# Patient Record
Sex: MALE | Race: WHITE | Employment: FULL TIME | URBAN - METROPOLITAN AREA
[De-identification: names, ages, dates, MRNs, and addresses within clinical notes are randomized per-mention and may not be internally consistent; named-entity substitution may affect disease eponyms.]

---

## 2017-08-30 ENCOUNTER — TELEPHONE (OUTPATIENT)
Dept: FAMILY MEDICINE | Facility: OTHER | Age: 47
End: 2017-08-30

## 2017-08-30 ENCOUNTER — OFFICE VISIT (OUTPATIENT)
Dept: FAMILY MEDICINE | Facility: OTHER | Age: 47
End: 2017-08-30
Payer: COMMERCIAL

## 2017-08-30 VITALS
RESPIRATION RATE: 16 BRPM | SYSTOLIC BLOOD PRESSURE: 128 MMHG | BODY MASS INDEX: 29.36 KG/M2 | DIASTOLIC BLOOD PRESSURE: 80 MMHG | TEMPERATURE: 97.4 F | WEIGHT: 253.8 LBS | HEART RATE: 56 BPM | HEIGHT: 78 IN

## 2017-08-30 DIAGNOSIS — M54.41 CHRONIC BILATERAL LOW BACK PAIN WITH BILATERAL SCIATICA: Primary | ICD-10-CM

## 2017-08-30 DIAGNOSIS — G45.9 TRANSIENT CEREBRAL ISCHEMIA, UNSPECIFIED TYPE: ICD-10-CM

## 2017-08-30 DIAGNOSIS — Z72.0 TOBACCO ABUSE: ICD-10-CM

## 2017-08-30 DIAGNOSIS — G89.29 CHRONIC BILATERAL LOW BACK PAIN WITH BILATERAL SCIATICA: Primary | ICD-10-CM

## 2017-08-30 DIAGNOSIS — M54.42 CHRONIC BILATERAL LOW BACK PAIN WITH BILATERAL SCIATICA: Primary | ICD-10-CM

## 2017-08-30 PROCEDURE — 99214 OFFICE O/P EST MOD 30 MIN: CPT | Performed by: NURSE PRACTITIONER

## 2017-08-30 RX ORDER — HYDROCODONE BITARTRATE AND ACETAMINOPHEN 5; 325 MG/1; MG/1
1-2 TABLET ORAL EVERY 6 HOURS PRN
Qty: 30 TABLET | Refills: 0 | Status: SHIPPED | OUTPATIENT
Start: 2017-08-30

## 2017-08-30 RX ORDER — CYCLOBENZAPRINE HCL 10 MG
10 TABLET ORAL 3 TIMES DAILY PRN
Qty: 30 TABLET | Refills: 1 | Status: SHIPPED | OUTPATIENT
Start: 2017-08-30 | End: 2018-09-27

## 2017-08-30 ASSESSMENT — PAIN SCALES - GENERAL: PAINLEVEL: EXTREME PAIN (9)

## 2017-08-30 NOTE — TELEPHONE ENCOUNTER
Bal Le is a 46 year old male who presents with low back pain.    NURSING ASSESSMENT:  Description:  Low pain, hurts to walk, radiation down back of both legs. spasms  Onset/duration:  On and off  Precip. factors:  Hx of CVA, TIA, hernia repair  Associated symptoms:  Numbness in armpit at times  Improves/worsens symptoms:  Ibuprofen, heat/ice rest not helping    Allergies: No Known Allergies    MEDICATIONS:   Taking over the counter medication(s?) Yes  Any medication side effects? No significant side effects    Any barriers to taking medication(s) as prescribed?  No  Medication(s) improving/managing symptoms?  No      NURSING PLAN: Nursing advice to patient should be seen today if possible    RECOMMENDED DISPOSITION:  See in 24 hours -   Will comply with recommendation: Yes  If further questions/concerns or if symptoms do not improve, worsen or new symptoms develop, call your PCP or [facility] as soon as possible.      Guideline used: back pain  Telephone Triage Protocols for Nurses, Fifth Edition, Sangeetha Redd RN

## 2017-08-30 NOTE — PROGRESS NOTES
SUBJECTIVE:   Bal Le is a 46 year old male who presents to clinic today for the following health issues:      Back Pain   Double hernia about 4 years ago    Duration: about 4 years, comes and goes        Specific cause: probably lifting at some point    Description:   Location of pain: low back all lower half of back and shooting into the back of his legs  Character of pain: stabbing  Pain radiation: down into legs, has been having spasms  New numbness or weakness in legs, not attributed to pain:  no but numbness in left upper shoulder when he is having his back pains    Intensity:  severe    History:   Pain interferes with job: YES,   History of back problems: yes, double hernia  Any previous MRI or X-rays: None  Sees a specialist for back pain:  No  Therapies tried without relief: chiropractor, naproxen, tylenol, heat, ice    Alleviating factors:   Improved by: after an adjustment seems to ease it up a bit, muscle relaxer for a GI thing he has and he had muscle relaxer and it helps his back    Precipitating factors:  Worsened by: Lying Flat and Walking            Accompanying Signs & Symptoms:  Risk of Fracture:  Recent history of trauma or blunt force and patient states multiple injuries in the past  Risk of Cauda Equina:  None  Risk of Infection:  None  Risk of Cancer:  None  Risk of Ankylosing Spondylitis:  Onset at age <35, male, AND morning back stiffness. no     Has had chronic back pain that comes and goes.  He thinks he has a pinched nerve.  Has lightening bolts going down both legs.  Lower back is cramping.  Ice packs.  Showers.  Tried some flexeril he had at home.  Has double hernia removed has flexeril at home for that and it helped.  It was old.  Has never had MRI of his back.  There is no comfortable position.  He did go to work today.  Does go to chiropractor.  Sees Dr. Posada in [place]. He has not done films.  No xrays of low backs.  Feels bladder and bowel intact.  No feet dragging.  No  "numbness or dragging in feet.      Has had a TIA in the past.  Is not taking his ASA.  Has not has his cholesterol checked.  States- he is \"fine\".  Is still smoking.  Declines smoking cessation today.    Problem list and histories reviewed & adjusted, as indicated.  Additional history: as documented    Patient Active Problem List   Diagnosis     CARDIOVASCULAR SCREENING; LDL GOAL LESS THAN 160     ETOH abuse     Health Care Home     Transient cerebral ischemia     Umbilical hernia     Past Surgical History:   Procedure Laterality Date     ESOPHAGOSCOPY, GASTROSCOPY, DUODENOSCOPY (EGD), COMBINED  1/27/2011    COMBINED ESOPHAGOSCOPY, GASTROSCOPY, DUODENOSCOPY (EGD), REMOVE FOREIGN BODY performed by LESLEY SOLIMAN at  GI     ESOPHAGOSCOPY, GASTROSCOPY, DUODENOSCOPY (EGD), COMBINED  1/27/2011    COMBINED ESOPHAGOSCOPY, GASTROSCOPY, DUODENOSCOPY (EGD), BIOPSY SINGLE OR MULTIPLE performed by LESLEY SOLIMAN at  GI     ESOPHAGOSCOPY, GASTROSCOPY, DUODENOSCOPY (EGD), COMBINED  3/7/2011    COMBINED ESOPHAGOSCOPY, GASTROSCOPY, DUODENOSCOPY (EGD) performed by LESLEY SOLIMAN at  GI     HERNIORRHAPHY INGUINAL  1/29/2013    Procedure: HERNIORRHAPHY INGUINAL;   attempted laparoscopic preperitoneal left inguinal hernia repair converted to open left inguinal hernia repair, Laparoscopic mesh repair of umbilical hernia ;  Surgeon: Landon Rose MD;  Location:  OR     LAPAROSCOPIC HERNIORRHAPHY UMBILICAL  1/29/2013    Procedure: LAPAROSCOPIC HERNIORRHAPHY UMBILICAL;;  Surgeon: Landon Rose MD;  Location:  OR       Social History   Substance Use Topics     Smoking status: Never Smoker     Smokeless tobacco: Current User     Types: Chew      Comment: Chews tobacco     Alcohol use 21.0 oz/week     42 Cans of beer per week      Comment: quit when had stroke     Family History   Problem Relation Age of Onset     Substance Abuse Mother      Substance Abuse Father      Unknown/Adopted Sister     " "     Current Outpatient Prescriptions   Medication Sig Dispense Refill     HYDROcodone-acetaminophen (NORCO) 5-325 MG per tablet Take 1-2 tablets by mouth every 6 hours as needed for moderate to severe pain maximum 6 tablet(s) per day 30 tablet 0     cyclobenzaprine (FLEXERIL) 10 MG tablet Take 1 tablet (10 mg) by mouth 3 times daily as needed for muscle spasms 30 tablet 1     IBUPROFEN PO Take 400 mg by mouth       Naproxen Sodium (ALEVE PO) Take 550 mg by mouth       ORDER FOR DME Dorsal (Anterior) Night Splint, Size L/XL, with [facility] agreement signed by patient       Pseudoeph-Doxylamine-DM-APAP (DAYQUIL/NYQUIL COLD/FLU RELIEF OR)        omeprazole (PRILOSEC) 40 MG capsule Take 1 capsule by mouth daily. (Patient not taking: Reported on 8/30/2017) 90 capsule 3     simvastatin (ZOCOR) 10 MG tablet Take 1 tablet by mouth At Bedtime. (Patient not taking: Reported on 8/30/2017) 30 tablet 1     aspirin 81 MG tablet Take 1 tablet by mouth daily. (Patient not taking: Reported on 8/30/2017) 90 tablet 3     No Known Allergies      Reviewed and updated as needed this visit by clinical staffTobacco  Allergies  Med Hx  Surg Hx  Fam Hx  Soc Hx      Reviewed and updated as needed this visit by Provider         ROS:  Constitutional, HEENT, cardiovascular, pulmonary, gi and gu systems are negative, except as otherwise noted.      OBJECTIVE:   /80 (BP Location: Right arm, Patient Position: Chair, Cuff Size: Adult Large)  Pulse 56  Temp 97.4  F (36.3  C) (Temporal)  Resp 16  Ht 6' 7\" (2.007 m)  Wt 253 lb 12.8 oz (115.1 kg)  BMI 28.59 kg/m2  Body mass index is 28.59 kg/(m^2).   GENERAL: healthy, alert and no distress  EYES: Eyes grossly normal to inspection, PERRL and conjunctivae and sclerae normal  NECK: no adenopathy, no asymmetry, masses, or scars and thyroid normal to palpation  RESP: lungs clear to auscultation - no rales, rhonchi or wheezes  CV: regular rate and rhythm, normal S1 S2, no S3 or S4, no murmur, " click or rub, no peripheral edema and peripheral pulses strong  ABDOMEN: soft, nontender, no hepatosplenomegaly, no masses and bowel sounds normal  MSK: Spine: lumbar no obvious deformity. No skin changes noted.nontender with palpation of mid to low back, gluts, hamstrings or quads. ROM very limited. He is unable to bend, it is painful for him to sit.  Unable to have him lay on exam table.  Can not perform Straight leg raise. Hamstrings are unable to fully test due to pain, quads 5/5 ankle ROM, and great toe flex/ext. No edema. Pulses normal. Patellar reflexes 2+ and symmetric. Achilles reflexes 1+ symmetric. Sensation intact to light touch of LE.  Is able to walk on heels and toes without difficulty.  abnormal gait.     ASSESSMENT/PLAN:     Problem List Items Addressed This Visit    1 Transient cerebral ischemia- discussed this with patient today- limited as he was not very interested in it- encouraged at least to get back on his ASA.      Other Visit Diagnoses    2 Chronic bilateral low back pain with bilateral sciatica    -  Primary  Will obtain plain films today.  Though discussed this could also be his hips but I am very limited in exam today.  He is seeing his chiropractor later today.  Due to chronicity and progression will also obtain MRI.  Did give him limited supply of pain pills- did check  database and he has not had any fills.      Relevant Medications    HYDROcodone-acetaminophen (NORCO) 5-325 MG per tablet    cyclobenzaprine (FLEXERIL) 10 MG tablet    Other Relevant Orders    XR Lumbar Spine 2/3 Views (Completed)    MR Lumbar Spine w/o Contrast (Completed)   3 Tobacco abuse   - declined assistance with cessation today          Follow up pending MRI.  Is going to see Chiro for therapy    Bethanie Coleman, AYUSH  [facility]

## 2017-08-30 NOTE — PATIENT INSTRUCTIONS
Back Care Tips    Caring for your back  These are things you can do to prevent a recurrence of acute back pain and to reduce symptoms from chronic back pain:    Maintain a healthy weight. If you are overweight, losing weight will help most types of back pain.    Exercise is an important part of recovery from most types of back pain. The muscles behind and in front of the spine support the back. This means strengthening both the back muscles and the abdominal muscles will provide better support for your spine.     Swimming and brisk walking are good overall exercises to improve your fitness level.    Practice safe lifting methods (below).    Practice good posture when sitting, standing and walking. Avoid prolonged sitting. This puts more stress on the lower back than standing or walking.    Wear quality shoes with sufficient arch support. Foot and ankle alignment can affect back symptoms. Women should avoid wearing high heels.    Therapeutic massage can help relax the back muscles without stretching them.    During the first 24 to 72 hours after an acute injury or flare-up of chronic back pain, apply an ice pack to the painful area for 20 minutes and then remove it for 20 minutes, over a period of 60 to 90 minutes, or several times a day. As a safety precaution, do not use a heating pad at bedtime. Sleeping on a heating pad can lead to skin burns or tissue damage.    You can alternate ice and heat therapies.  Medications  Talk to your healthcare provider before using medicines, especially if you have other medical problems or are taking other medicines.    You may use acetaminophen or ibuprofen to control pain, unless your healthcare provider prescribed other pain medicine. If you have chronic conditions like diabetes, liver or kidney disease, stomach ulcers, or gastrointestinal bleeding, or are taking blood thinners, talk with your healthcare provider before taking any medicines.    Be careful if you are given  prescription pain medicines, narcotics, or medicine for muscle spasm. They can cause drowsiness, affect your coordination, reflexes, and judgment. Do not drive or operate heavy machinery while taking these types of medicines. Take prescription pain medicine only as prescribed by your healthcare provider.  Lumbar stretch  Here is a simple stretching exercise that will help relax muscle spasm and keep your back more limber. If exercise makes your back pain worse, don t do it.    Lie on your back with your knees bent and both feet on the ground.    Slowly raise your left knee to your chest as you flatten your lower back against the floor. Hold for 5 seconds.    Relax and repeat the exercise with your right knee.    Do 10 of these exercises for each leg.  Safe lifting method    Don t bend over at the waist to lift an object off the floor.  Instead, bend your knees and hips in a squat.     Keep your back and head upright    Hold the object close to your body, directly in front of you.    Straighten your legs to lift the object.     Lower the object to the floor in the reverse fashion.    If you must slide something across the floor, push it.  Posture tips  Sitting  Sit in chairs with straight backs or low-back support. Keep your knees lower than your hips, with your feet flat on the floor.  When driving, sit up straight. Adjust the seat forward so you are not leaning toward the steering wheel.  A small pillow or rolled towel behind your lower back may help if you are driving long distances.   Standing  When standing for long periods, shift most of your weight to one leg at a time. Alternate legs every few minutes.   Sleeping  The best way to sleep is on your side with your knees bent. Put a low pillow under your head to support your neck in a neutral spine position. Avoid thick pillows that bend your neck to one side. Put a pillow between your legs to further relax your lower back. If you sleep on your back, put pillows  under your knees to support your legs in a slightly flexed position. Use a firm mattress. If your mattress sags, replace it, or use a 1/2-inch plywood board under the mattress to add support.  Follow-up care  Follow up with your healthcare provider, or as advised.  If X-rays, a CT scan or an MRI scan were taken, they will be reviewed by a radiologist. You will be notified of any new findings that may affect your care.  Call 911  Seek emergency medical care if any of the following occur:    Trouble breathing    Confusion    Very drowsy    Fainting or loss of consciousness    Rapid or very slow heart rate    Loss of  bowel or bladder control  When to seek medical care  Call your healthcare provider if any of the following occur:    Pain becomes worse or spreads to your arms or legs    Weakness or numbness in one or both arms or legs    Numbness in the groin area  Date Last Reviewed: 6/1/2016 2000-2017 [employer]. [address] [place] [address]. All rights reserved. This information is not intended as a substitute for professional medical care. Always follow your healthcare professional's instructions.        Relieving Back Pain  Back pain is a common problem. You can strain back muscles by lifting too much weight or just by moving the wrong way. Back strain can be uncomfortable, even painful. And it can take weeks or months to improve. To help yourself feel better and prevent future back strains, try these tips.  Important Note: Do not give aspirin to children or teens without first discussing it with your healthcare provider.      ? Ice    Ice reduces muscle pain and swelling. It helps most during the first 24 to 48 hours after an injury.    Wrap an ice pack or a bag of frozen peas in a thin towel. (Never place ice directly on your skin.)    Place the ice where your back hurts the most.    Don t ice for more than 20 minutes at a time.    You can use ice several times a  day.  ? Medicines  Over-the-counter pain relievers can include acetaminophen and anti-inflammatory medicines, which includes aspirin or ibuprofen. They can help ease discomfort. Some also reduce swelling.    Tell your healthcare provider about any medicines you are already taking.    Take medicines only as directed.  ? Heat  After the first 48 hours, heat can relax sore muscles and improve blood flow.    Try a warm bath or shower. Or use a heating pad set on low. To prevent a burn, keep a cloth between you and the heating pad.    Don t use a heating pad for more than 15 minutes at a time. Never sleep on a heating pad.  Date Last Reviewed: 9/1/2015 2000-2017 [employer]. [address] [place] [address], [place] [address]. All rights reserved. This information is not intended as a substitute for professional medical care. Always follow your healthcare professional's instructions.

## 2017-08-30 NOTE — MR AVS SNAPSHOT
After Visit Summary   8/30/2017    Bal Le    MRN: [id number]           Patient Information     Date Of Birth          1970        Visit Information        Provider Department      8/30/2017 9:00 AM Bethanie Coleman NP [facility]        Today's Diagnoses     Chronic bilateral low back pain with bilateral sciatica    -  1      Care Instructions      Back Care Tips    Caring for your back  These are things you can do to prevent a recurrence of acute back pain and to reduce symptoms from chronic back pain:    Maintain a healthy weight. If you are overweight, losing weight will help most types of back pain.    Exercise is an important part of recovery from most types of back pain. The muscles behind and in front of the spine support the back. This means strengthening both the back muscles and the abdominal muscles will provide better support for your spine.     Swimming and brisk walking are good overall exercises to improve your fitness level.    Practice safe lifting methods (below).    Practice good posture when sitting, standing and walking. Avoid prolonged sitting. This puts more stress on the lower back than standing or walking.    Wear quality shoes with sufficient arch support. Foot and ankle alignment can affect back symptoms. Women should avoid wearing high heels.    Therapeutic massage can help relax the back muscles without stretching them.    During the first 24 to 72 hours after an acute injury or flare-up of chronic back pain, apply an ice pack to the painful area for 20 minutes and then remove it for 20 minutes, over a period of 60 to 90 minutes, or several times a day. As a safety precaution, do not use a heating pad at bedtime. Sleeping on a heating pad can lead to skin burns or tissue damage.    You can alternate ice and heat therapies.  Medications  Talk to your healthcare provider before using medicines, especially if you have other medical problems or are  taking other medicines.    You may use acetaminophen or ibuprofen to control pain, unless your healthcare provider prescribed other pain medicine. If you have chronic conditions like diabetes, liver or kidney disease, stomach ulcers, or gastrointestinal bleeding, or are taking blood thinners, talk with your healthcare provider before taking any medicines.    Be careful if you are given prescription pain medicines, narcotics, or medicine for muscle spasm. They can cause drowsiness, affect your coordination, reflexes, and judgment. Do not drive or operate heavy machinery while taking these types of medicines. Take prescription pain medicine only as prescribed by your healthcare provider.  Lumbar stretch  Here is a simple stretching exercise that will help relax muscle spasm and keep your back more limber. If exercise makes your back pain worse, don t do it.    Lie on your back with your knees bent and both feet on the ground.    Slowly raise your left knee to your chest as you flatten your lower back against the floor. Hold for 5 seconds.    Relax and repeat the exercise with your right knee.    Do 10 of these exercises for each leg.  Safe lifting method    Don t bend over at the waist to lift an object off the floor.  Instead, bend your knees and hips in a squat.     Keep your back and head upright    Hold the object close to your body, directly in front of you.    Straighten your legs to lift the object.     Lower the object to the floor in the reverse fashion.    If you must slide something across the floor, push it.  Posture tips  Sitting  Sit in chairs with straight backs or low-back support. Keep your knees lower than your hips, with your feet flat on the floor.  When driving, sit up straight. Adjust the seat forward so you are not leaning toward the steering wheel.  A small pillow or rolled towel behind your lower back may help if you are driving long distances.   Standing  When standing for long periods, shift  most of your weight to one leg at a time. Alternate legs every few minutes.   Sleeping  The best way to sleep is on your side with your knees bent. Put a low pillow under your head to support your neck in a neutral spine position. Avoid thick pillows that bend your neck to one side. Put a pillow between your legs to further relax your lower back. If you sleep on your back, put pillows under your knees to support your legs in a slightly flexed position. Use a firm mattress. If your mattress sags, replace it, or use a 1/2-inch plywood board under the mattress to add support.  Follow-up care  Follow up with your healthcare provider, or as advised.  If X-rays, a CT scan or an MRI scan were taken, they will be reviewed by a radiologist. You will be notified of any new findings that may affect your care.  Call 911  Seek emergency medical care if any of the following occur:    Trouble breathing    Confusion    Very drowsy    Fainting or loss of consciousness    Rapid or very slow heart rate    Loss of  bowel or bladder control  When to seek medical care  Call your healthcare provider if any of the following occur:    Pain becomes worse or spreads to your arms or legs    Weakness or numbness in one or both arms or legs    Numbness in the groin area  Date Last Reviewed: 6/1/2016 2000-2017 [employer]. [address] [place] [address], [place] [address]. All rights reserved. This information is not intended as a substitute for professional medical care. Always follow your healthcare professional's instructions.        Relieving Back Pain  Back pain is a common problem. You can strain back muscles by lifting too much weight or just by moving the wrong way. Back strain can be uncomfortable, even painful. And it can take weeks or months to improve. To help yourself feel better and prevent future back strains, try these tips.  Important Note: Do not give aspirin to children or teens without first discussing it with your  healthcare provider.      ? Ice    Ice reduces muscle pain and swelling. It helps most during the first 24 to 48 hours after an injury.    Wrap an ice pack or a bag of frozen peas in a thin towel. (Never place ice directly on your skin.)    Place the ice where your back hurts the most.    Don t ice for more than 20 minutes at a time.    You can use ice several times a day.  ? Medicines  Over-the-counter pain relievers can include acetaminophen and anti-inflammatory medicines, which includes aspirin or ibuprofen. They can help ease discomfort. Some also reduce swelling.    Tell your healthcare provider about any medicines you are already taking.    Take medicines only as directed.  ? Heat  After the first 48 hours, heat can relax sore muscles and improve blood flow.    Try a warm bath or shower. Or use a heating pad set on low. To prevent a burn, keep a cloth between you and the heating pad.    Don t use a heating pad for more than 15 minutes at a time. Never sleep on a heating pad.  Date Last Reviewed: 9/1/2015 2000-2017 [employer] [address] [place] [address]. All rights reserved. This information is not intended as a substitute for professional medical care. Always follow your healthcare professional's instructions.                Follow-ups after your visit        Your next 10 appointments already scheduled     Aug 31, 2017  8:15 AM CDT   XR LUMBAR SPINE 2/3 VIEWS with PHXR2   [facility])    [address] [place] [id number] [phone]           Please bring a list of your current medicines to your exam. (Include vitamins, minerals and over-thecounter medicines.) Leave your valuables at home.  Tell your doctor if there is a chance you may be pregnant.  You do not need to do anything special for this exam.            Aug 31, 2017  8:45 AM CDT   MR LUMBAR SPINE W/O CONTRAST with PHMR1   [facility] (Stop[facility]  [facility])    [address] [id number] [phone]           Take your medicines as usual, unless your doctor tells you not to. Bring a list of your current medicines to your exam (including vitamins, minerals and over-the-counter drugs). Also bring the results of similar scans you may have had.  Please remove any body piercings and hair extensions before you arrive.  Follow your doctor s orders. If you do not, we may have to postpone your exam.  You will not have contrast for this exam. You do not need to do anything special to prepare.  The MRI machine uses a strong magnet. Please wear clothes without metal (snaps, zippers). A sweatsuit works well, or we may give you a hospital gown.   **IMPORTANT** THE INSTRUCTIONS BELOW ARE ONLY FOR THOSE PATIENTS WHO HAVE BEEN TOLD THEY WILL RECEIVE SEDATION OR GENERAL ANESTHESIA DURING THEIR MRI PROCEDURE:  IF YOU WILL RECEIVE SEDATION (take medicine to help you relax during your exam):   You must get the medicine from your doctor before you arrive. Bring the medicine to the exam. Do not take it at home.   Arrive one hour early. Bring someone who can take you home after the test. Your medicine will make you sleepy. After the exam, you may not drive, take a bus or take a taxi by yourself.   No eating 8 hours before your exam. You may have clear liquids up until 4 hours before your exam. (Clear liquids include water, clear tea, black coffee and fruit juice without pulp.)  IF YOU WILL RECEIVE ANESTHESIA (be asleep for your exam):   Arrive 1 1/2 hours early. Bring someone who can take you home after the test. You may not drive, take a bus or take a taxi by yourself.   No eating 8 hours before your exam. You may have clear liquids up until 4 hours before your exam. (Clear liquids include water, clear tea, black coffee and fruit juice without pulp.)   You will spend four to five hours in the recovery room.  Please call the Imaging Department at your  "exam site with any questions.              Future tests that were ordered for you today     Open Future Orders        Priority Expected Expires Ordered    MR Lumbar Spine w/o Contrast Routine  2018            Who to contact     If you have questions or need follow up information about today's clinic visit or your schedule please contact [facility] directly at [phone].  Normal or non-critical lab and imaging results will be communicated to you by MyChart, letter or phone within 4 business days after the clinic has received the results. If you do not hear from us within 7 days, please contact the clinic through [employer] or phone. If you have a critical or abnormal lab result, we will notify you by phone as soon as possible.  Submit refill requests through F[employer]box or call your pharmacy and they will forward the refill request to us. Please allow 3 business days for your refill to be completed.          Additional Information About Your Visit        [employer] Information     Fn[employer] lets you send messages to your doctor, view your test results, renew your prescriptions, schedule appointments and more. To sign up, go to [email or web address] . Click on \"Log in\" on the left side of the screen, which will take you to the Welcome page. Then click on \"Sign up Now\" on the right side of the page.     You will be asked to enter the access code listed below, as well as some personal information. Please follow the directions to create your username and password.     Your access code is: [email or web address]  Expires: 2017 10:03 AM     Your access code will  in 90 days. If you need help or a new code, please call your [facility] clinic or [phone].        Care EveryWhere ID     This is your Care EveryWhere ID. This could be used by other organizations to access your [facility] medical records  [id number]        Your Vitals Were     Pulse Temperature Respirations Height BMI (Body Mass Index) " "      56 97.4  F (36.3  C) (Temporal) 16 6' 7\" (2.007 m) 28.59 kg/m2        Blood Pressure from Last 3 Encounters:   08/30/17 128/80   11/10/16 114/72   10/30/15 128/74    Weight from Last 3 Encounters:   08/30/17 253 lb 12.8 oz (115.1 kg)   11/10/16 243 lb (110.2 kg)   10/30/15 245 lb (111.1 kg)                 Today's Medication Changes          These changes are accurate as of: 8/30/17 10:08 AM.  If you have any questions, ask your nurse or doctor.               Start taking these medicines.        Dose/Directions    cyclobenzaprine 10 MG tablet   Commonly known as:  FLEXERIL   Used for:  Chronic bilateral low back pain with bilateral sciatica   Started by:  Bethanie Coleman, AYUSH        Dose:  10 mg   Take 1 tablet (10 mg) by mouth 3 times daily as needed for muscle spasms   Quantity:  30 tablet   Refills:  1       HYDROcodone-acetaminophen 5-325 MG per tablet   Commonly known as:  NORCO   Used for:  Chronic bilateral low back pain with bilateral sciatica   Started by:  Bethanie Coleman, NP        Dose:  1-2 tablet   Take 1-2 tablets by mouth every 6 hours as needed for moderate to severe pain maximum 6 tablet(s) per day   Quantity:  30 tablet   Refills:  0            Where to get your medicines      These medications were sent to [facility] - [address]  [address] [address]     Phone:  [phone]     cyclobenzaprine 10 MG tablet         Some of these will need a paper prescription and others can be bought over the counter.  Ask your nurse if you have questions.     Bring a paper prescription for each of these medications     HYDROcodone-acetaminophen 5-325 MG per tablet                Primary Care Provider Office Phone # Fax #    Cr Roberts [phone]       [facility] [address] [place] [address]        Equal Access to Services     RAKESH PEREZ AH: Yonatan Rodriguez, reddy aviles, " miguel cross ah. So [facility] [phone].    ATENCIÓN: Si gifty kaufman, tiene a doran disposición servicios gratuitos de asistencia lingüística. Chon al [phone].    We comply with applicable federal civil rights laws and [place] laws. We do not discriminate on the basis of race, color, national origin, age, disability sex, sexual orientation or gender identity.            Thank you!     Thank you for choosing [facility]  for your care. Our goal is always to provide you with excellent care. Hearing back from our patients is one way we can continue to improve our services. Please take a few minutes to complete the written survey that you may receive in the mail after your visit with us. Thank you!             Your Updated Medication List - Protect others around you: Learn how to safely use, store and throw away your medicines at www.disposemymeds.org.          This list is accurate as of: 8/30/17 10:08 AM.  Always use your most recent med list.                   Brand Name Dispense Instructions for use Diagnosis    ALEVE PO      Take 550 mg by mouth        aspirin 81 MG tablet     90 tablet    Take 1 tablet by mouth daily.    TIA (transient ischaemic attack)       cyclobenzaprine 10 MG tablet    FLEXERIL    30 tablet    Take 1 tablet (10 mg) by mouth 3 times daily as needed for muscle spasms    Chronic bilateral low back pain with bilateral sciatica       DAYQUIL/NYQUIL COLD/FLU RELIEF OR           HYDROcodone-acetaminophen 5-325 MG per tablet    NORCO    30 tablet    Take 1-2 tablets by mouth every 6 hours as needed for moderate to severe pain maximum 6 tablet(s) per day    Chronic bilateral low back pain with bilateral sciatica       IBUPROFEN PO      Take 400 mg by mouth        omeprazole 40 MG capsule    priLOSEC    90 capsule    Take 1 capsule by mouth daily.    Esophageal foreign body       order for DME      Dorsal (Anterior) Night Splint,  Size L/XL, with [facility] agreement signed by patient    Plantar fascial fibromatosis, Equinus deformity of foot       simvastatin 10 MG tablet    ZOCOR    30 tablet    Take 1 tablet by mouth At Bedtime.    TIA (transient ischaemic attack)

## 2017-08-31 ENCOUNTER — HOSPITAL ENCOUNTER (OUTPATIENT)
Dept: MRI IMAGING | Facility: CLINIC | Age: 47
Discharge: HOME | End: 2017-08-31
Attending: NURSE PRACTITIONER | Admitting: NURSE PRACTITIONER
Payer: COMMERCIAL

## 2017-08-31 ENCOUNTER — HOSPITAL ENCOUNTER (OUTPATIENT)
Dept: GENERAL RADIOLOGY | Facility: CLINIC | Age: 47
End: 2017-08-31
Attending: NURSE PRACTITIONER
Payer: COMMERCIAL

## 2017-08-31 DIAGNOSIS — M54.41 CHRONIC BILATERAL LOW BACK PAIN WITH BILATERAL SCIATICA: ICD-10-CM

## 2017-08-31 DIAGNOSIS — M54.42 CHRONIC BILATERAL LOW BACK PAIN WITH BILATERAL SCIATICA: ICD-10-CM

## 2017-08-31 DIAGNOSIS — G89.29 CHRONIC BILATERAL LOW BACK PAIN WITH BILATERAL SCIATICA: ICD-10-CM

## 2017-08-31 PROCEDURE — 72100 X-RAY EXAM L-S SPINE 2/3 VWS: CPT | Mod: TC

## 2017-08-31 PROCEDURE — 72148 MRI LUMBAR SPINE W/O DYE: CPT

## 2018-09-27 ENCOUNTER — OFFICE VISIT (OUTPATIENT)
Dept: FAMILY MEDICINE | Facility: OTHER | Age: 48
End: 2018-09-27
Payer: COMMERCIAL

## 2018-09-27 VITALS
TEMPERATURE: 97.5 F | SYSTOLIC BLOOD PRESSURE: 150 MMHG | RESPIRATION RATE: 12 BRPM | DIASTOLIC BLOOD PRESSURE: 80 MMHG | HEART RATE: 66 BPM | BODY MASS INDEX: 25.35 KG/M2 | WEIGHT: 225 LBS

## 2018-09-27 DIAGNOSIS — M54.42 ACUTE BILATERAL LOW BACK PAIN WITH LEFT-SIDED SCIATICA: Primary | ICD-10-CM

## 2018-09-27 PROCEDURE — 99213 OFFICE O/P EST LOW 20 MIN: CPT | Performed by: NURSE PRACTITIONER

## 2018-09-27 RX ORDER — HYDROCODONE BITARTRATE AND ACETAMINOPHEN 5; 325 MG/1; MG/1
1 TABLET ORAL EVERY 4 HOURS PRN
Qty: 18 TABLET | Refills: 0 | Status: SHIPPED | OUTPATIENT
Start: 2018-09-27

## 2018-09-27 RX ORDER — CYCLOBENZAPRINE HCL 10 MG
10 TABLET ORAL 3 TIMES DAILY PRN
COMMUNITY

## 2018-09-27 RX ORDER — METHYLPREDNISOLONE 4 MG
TABLET, DOSE PACK ORAL
Qty: 21 TABLET | Refills: 0 | Status: SHIPPED | OUTPATIENT
Start: 2018-09-27

## 2018-09-27 RX ORDER — CYCLOBENZAPRINE HCL 10 MG
10 TABLET ORAL 3 TIMES DAILY PRN
Qty: 30 TABLET | Refills: 1 | Status: SHIPPED | OUTPATIENT
Start: 2018-09-27

## 2018-09-27 ASSESSMENT — PAIN SCALES - GENERAL: PAINLEVEL: SEVERE PAIN (7)

## 2018-09-27 NOTE — MR AVS SNAPSHOT
After Visit Summary   9/27/2018    Bal Le    MRN: [id number]           Patient Information     Date Of Birth          1970        Visit Information        Provider Department      9/27/2018 3:00 PM Bethanie Coleman NP [facility]        Today's Diagnoses     Acute bilateral low back pain with left-sided sciatica    -  1      Care Instructions    Take the steroid as directed  Can take the flexeril every eight hours as needed for muscle spasm  Can take the hydrocodone every six hours as needed for pain not controlled by ibuprofen  Ice or heat whichever is comfortable.  Gentle stretching  No heavy lifting  Follow up if no improvement          Follow-ups after your visit        Follow-up notes from your care team     Return in about 4 weeks (around 10/25/2018), or if symptoms worsen or fail to improve.      Your next 10 appointments already scheduled     Sep 27, 2018  3:00 PM CDT   Office Visit with Bethanie Coleman NP   [facility] ([facility])    [address] [address] [phone]           Bring a current list of meds and any records pertaining to this visit. For Physicals, please bring immunization records and any forms needing to be filled out. Please arrive 10 minutes early to complete paperwork.              Who to contact     If you have questions or need follow up information about today's clinic visit or your schedule please contact [facility] directly at [phone].  Normal or non-critical lab and imaging results will be communicated to you by MyChart, letter or phone within 4 business days after the clinic has received the results. If you do not hear from us within 7 days, please contact the clinic through MyChart or phone. If you have a critical or abnormal lab result, we will notify you by phone as soon as possible.  Submit refill requests through [employer] or call your pharmacy and they will  "forward the refill request to us. Please allow 3 business days for your refill to be completed.          Additional Information About Your Visit        [employer] Information     [employer] lets you send messages to your doctor, view your test results, renew your prescriptions, schedule appointments and more. To sign up, go to [email or web address] . Click on \"Log in\" on the left side of the screen, which will take you to the Welcome page. Then click on \"Sign up Now\" on the right side of the page.     You will be asked to enter the access code listed below, as well as some personal information. Please follow the directions to create your username and password.     Your access code is: [email or web address]  Expires: 2018  8:26 AM     Your access code will  in 90 days. If you need help or a new code, please call your [place] clinic or [phone].        Care EveryWhere ID     This is your Care EveryWhere ID. This could be used by other organizations to access your [place] medical records  [id number]        Your Vitals Were     Pulse Temperature Respirations BMI (Body Mass Index)          66 97.5  F (36.4  C) (Temporal) 12 25.35 kg/m2         Blood Pressure from Last 3 Encounters:   18 150/80   17 128/80   11/10/16 114/72    Weight from Last 3 Encounters:   18 225 lb (102.1 kg)   17 253 lb 12.8 oz (115.1 kg)   11/10/16 243 lb (110.2 kg)              Today, you had the following     No orders found for display         Today's Medication Changes          These changes are accurate as of 18  8:26 AM.  If you have any questions, ask your nurse or doctor.               Start taking these medicines.        Dose/Directions    methylPREDNISolone 4 MG tablet   Commonly known as:  MEDROL DOSEPAK   Used for:  Acute bilateral low back pain with left-sided sciatica   Started by:  Bethanie Coleman, NP        Follow package instructions   Quantity:  21 tablet   Refills:  0         These medicines " have changed or have updated prescriptions.        Dose/Directions    * cyclobenzaprine 10 MG tablet   Commonly known as:  FLEXERIL   This may have changed:  Another medication with the same name was added. Make sure you understand how and when to take each.   Changed by:  Bethanie Coleman NP        Dose:  10 mg   Take 10 mg by mouth 3 times daily as needed for muscle spasms   Refills:  0       * cyclobenzaprine 10 MG tablet   Commonly known as:  FLEXERIL   This may have changed:  You were already taking a medication with the same name, and this prescription was added. Make sure you understand how and when to take each.   Used for:  Acute bilateral low back pain with left-sided sciatica   Changed by:  Bethanie Coleman NP        Dose:  10 mg   Take 1 tablet (10 mg) by mouth 3 times daily as needed for muscle spasms   Quantity:  30 tablet   Refills:  1       * HYDROcodone-acetaminophen 5-325 MG per tablet   Commonly known as:  NORCO   This may have changed:  Another medication with the same name was added. Make sure you understand how and when to take each.   Used for:  Chronic bilateral low back pain with bilateral sciatica   Changed by:  Bethanie Coleman NP        Dose:  1-2 tablet   Take 1-2 tablets by mouth every 6 hours as needed for moderate to severe pain maximum 6 tablet(s) per day   Quantity:  30 tablet   Refills:  0       * HYDROcodone-acetaminophen 5-325 MG per tablet   Commonly known as:  NORCO   This may have changed:  You were already taking a medication with the same name, and this prescription was added. Make sure you understand how and when to take each.   Used for:  Acute bilateral low back pain with left-sided sciatica   Changed by:  Bethanie Coleman NP        Dose:  1 tablet   Take 1 tablet by mouth every 4 hours as needed for pain   Quantity:  18 tablet   Refills:  0       * Notice:  This list has 4 medication(s) that are the same as other medications prescribed for you. Read the  directions carefully, and ask your doctor or other care provider to review them with you.         Where to get your medicines      These medications were sent to [facility] - [address] [place]  [address] [place] [address]     Phone:  [phone]     cyclobenzaprine 10 MG tablet    methylPREDNISolone 4 MG tablet         Some of these will need a paper prescription and others can be bought over the counter.  Ask your nurse if you have questions.     Bring a paper prescription for each of these medications     HYDROcodone-acetaminophen 5-325 MG per tablet               Information about OPIOIDS     PRESCRIPTION OPIOIDS: WHAT YOU NEED TO KNOW   We gave you an opioid (narcotic) pain medicine. It is important to manage your pain, but opioids are not always the best choice. You should first try all the other options your care team gave you. Take this medicine for as short a time (and as few doses) as possible.    Some activities can increase your pain, such as bandage changes or therapy sessions. It may help to take your pain medicine 30 to 60 minutes before these activities. Reduce your stress by getting enough sleep, working on hobbies you enjoy and practicing relaxation or meditation. Talk to your care team about ways to manage your pain beyond prescription opioids.    These medicines have risks:    DO NOT drive when on new or higher doses of pain medicine. These medicines can affect your alertness and reaction times, and you could be arrested for driving under the influence (DUI). If you need to use opioids long-term, talk to your care team about driving.    DO NOT operate heavy machinery    DO NOT do any other dangerous activities while taking these medicines.    DO NOT drink any alcohol while taking these medicines.     If the opioid prescribed includes acetaminophen, DO NOT take with any other medicines that contain acetaminophen. Read all labels carefully. Look for the  word  acetaminophen  or  Tylenol.  Ask your pharmacist if you have questions or are unsure.    You can get addicted to pain medicines, especially if you have a history of addiction (chemical, alcohol or substance dependence). Talk to your care team about ways to reduce this risk.    All opioids tend to cause constipation. Drink plenty of water and eat foods that have a lot of fiber, such as fruits, vegetables, prune juice, apple juice and high-fiber cereal. Take a laxative (Miralax, milk of magnesia, Colace, Senna) if you don t move your bowels at least every other day. Other side effects include upset stomach, sleepiness, dizziness, throwing up, tolerance (needing more of the medicine to have the same effect), physical dependence and slowed breathing.    Store your pills in a secure place, locked if possible. We will not replace any lost or stolen medicine. If you don t finish your medicine, please throw away (dispose) as directed by your pharmacist. The [place] [employer]ution [employer] has more information about safe disposal: [email or web address]-medications         Primary Care Provider Office Phone # Fax #    Cr Roberts [phone]       [address] [address]        Equal Access to Services     RAKESH PEREZ : Hadii charles che hadasho Soomaali, waaxda luqadaha, qaybta kaalmada adeegyada, miguel felix. So [facility] [phone].    ATENCIÓN: Si habla español, tiene a doran disposición servicios gratuitos de asistencia lingüística. Llame al [phone].    We comply with applicable federal civil rights laws and [place] laws. We do not discriminate on the basis of race, color, national origin, age, disability, sex, sexual orientation, or gender identity.            Thank you!     Thank you for choosing [facility]  for your care. Our goal is always to provide you with excellent care. Hearing  back from our patients is one way we can continue to improve our services. Please take a few minutes to complete the written survey that you may receive in the mail after your visit with us. Thank you!             Your Updated Medication List - Protect others around you: Learn how to safely use, store and throw away your medicines at www.disposemymeds.org.          This list is accurate as of 9/27/18  8:26 AM.  Always use your most recent med list.                   Brand Name Dispense Instructions for use Diagnosis    ALEVE PO      Take 550 mg by mouth        aspirin 81 MG tablet     90 tablet    Take 1 tablet by mouth daily.    TIA (transient ischaemic attack)       * cyclobenzaprine 10 MG tablet    FLEXERIL     Take 10 mg by mouth 3 times daily as needed for muscle spasms        * cyclobenzaprine 10 MG tablet    FLEXERIL    30 tablet    Take 1 tablet (10 mg) by mouth 3 times daily as needed for muscle spasms    Acute bilateral low back pain with left-sided sciatica       * HYDROcodone-acetaminophen 5-325 MG per tablet    NORCO    30 tablet    Take 1-2 tablets by mouth every 6 hours as needed for moderate to severe pain maximum 6 tablet(s) per day    Chronic bilateral low back pain with bilateral sciatica       * HYDROcodone-acetaminophen 5-325 MG per tablet    NORCO    18 tablet    Take 1 tablet by mouth every 4 hours as needed for pain    Acute bilateral low back pain with left-sided sciatica       IBUPROFEN PO      Take 400 mg by mouth        methylPREDNISolone 4 MG tablet    MEDROL DOSEPAK    21 tablet    Follow package instructions    Acute bilateral low back pain with left-sided sciatica       omeprazole 40 MG capsule    priLOSEC    90 capsule    Take 1 capsule by mouth daily.    Esophageal foreign body       order for DME      Dorsal (Anterior) Night Splint, Size L/XL, with [facility] agreement signed by patient    Plantar fascial fibromatosis, Equinus deformity of foot       simvastatin 10 MG tablet    ZOCOR     30 tablet    Take 1 tablet by mouth At Bedtime.    TIA (transient ischaemic attack)       * Notice:  This list has 4 medication(s) that are the same as other medications prescribed for you. Read the directions carefully, and ask your doctor or other care provider to review them with you.

## 2018-09-27 NOTE — PROGRESS NOTES
SUBJECTIVE:   Bal Le is a 48 year old male who presents to clinic today for the following health issues:  HPI  Back Pain       Duration: started Monday        Specific cause: rolling a tree    Description:   Location of pain: low back left  Character of pain: sharp and constant  Pain radiation:left groin to left hip to left lower back  New numbness or weakness in legs, not attributed to pain:  no     Intensity: Currently 6-7/10    History:   Pain interferes with job: YES  History of back problems: YES  Any previous MRI or X-rays: Yes- at [place].   Sees a specialist for back pain:  Chiropractor to keep hips in alignment   Therapies tried without relief: aleve and hydrocodone, heating pads     Alleviating factors:   Improved by: tens unit and rest      Precipitating factors:  Worsened by: movement and anything jarring     Does see Dr. Posada for chiropractor.  Has had MRI last year.  Is .  Monday was doing tree work and got a little twisted around- fell- smashed phone.  Now with low back pain.  Left lower back then up around into groin- sharp.  Has to lift leg due to pain.  No problems with voiding, stools.          Accompanying Signs & Symptoms:  Risk of Fracture:  None  Risk of Cauda Equina:  None  Risk of Infection:  None  Risk of Cancer:  None  Risk of Ankylosing Spondylitis:  Onset at age <35, male, AND morning back stiffness. no         Problem list and histories reviewed & adjusted, as indicated.  Additional history: as documented      Patient Active Problem List   Diagnosis     CARDIOVASCULAR SCREENING; LDL GOAL LESS THAN 160     ETOH abuse     Health Care Home     Transient cerebral ischemia     Umbilical hernia     Past Surgical History:   Procedure Laterality Date     ESOPHAGOSCOPY, GASTROSCOPY, DUODENOSCOPY (EGD), COMBINED  1/27/2011    COMBINED ESOPHAGOSCOPY, GASTROSCOPY, DUODENOSCOPY (EGD), REMOVE FOREIGN BODY performed by [facility] SOLIMAN at  [facility]     ESOPHAGOSCOPY, GASTROSCOPY,  DUODENOSCOPY (EGD), COMBINED  1/27/2011    COMBINED ESOPHAGOSCOPY, GASTROSCOPY, DUODENOSCOPY (EGD), BIOPSY SINGLE OR MULTIPLE performed by LESLEY SOLIMAN at  [facility]     ESOPHAGOSCOPY, GASTROSCOPY, DUODENOSCOPY (EGD), COMBINED  3/7/2011    COMBINED ESOPHAGOSCOPY, GASTROSCOPY, DUODENOSCOPY (EGD) performed by LESLEY SOLIMAN at  [facility]     HERNIORRHAPHY INGUINAL  1/29/2013    Procedure: HERNIORRHAPHY INGUINAL;   attempted laparoscopic preperitoneal left inguinal hernia repair converted to open left inguinal hernia repair, Laparoscopic mesh repair of umbilical hernia ;  Surgeon: Landon Rose MD;  Location: [facility] OR     LAPAROSCOPIC HERNIORRHAPHY UMBILICAL  1/29/2013    Procedure: LAPAROSCOPIC HERNIORRHAPHY UMBILICAL;;  Surgeon: Landon Rose MD;  Location: [facility] OR       Social History   Substance Use Topics     Smoking status: Never Smoker     Smokeless tobacco: Current User     Types: Chew      Comment: Chews tobacco     Alcohol use 21.0 oz/week     42 Cans of beer per week      Comment: quit when had stroke     Family History   Problem Relation Age of Onset     Substance Abuse Mother      Substance Abuse Father      Unknown/Adopted Sister          Current Outpatient Prescriptions   Medication Sig Dispense Refill     aspirin 81 MG tablet Take 1 tablet by mouth daily. 90 tablet 3     cyclobenzaprine (FLEXERIL) 10 MG tablet Take 10 mg by mouth 3 times daily as needed for muscle spasms       cyclobenzaprine (FLEXERIL) 10 MG tablet Take 1 tablet (10 mg) by mouth 3 times daily as needed for muscle spasms 30 tablet 1     HYDROcodone-acetaminophen (NORCO) 5-325 MG per tablet Take 1 tablet by mouth every 4 hours as needed for pain 18 tablet 0     HYDROcodone-acetaminophen (NORCO) 5-325 MG per tablet Take 1-2 tablets by mouth every 6 hours as needed for moderate to severe pain maximum 6 tablet(s) per day 30 tablet 0     methylPREDNISolone (MEDROL DOSEPAK) 4 MG tablet Follow package instructions 21 tablet 0      Naproxen Sodium (ALEVE PO) Take 550 mg by mouth       omeprazole (PRILOSEC) 40 MG capsule Take 1 capsule by mouth daily. 90 capsule 3     ORDER FOR DME Dorsal (Anterior) Night Splint, Size L/XL, with [facility] agreement signed by patient       simvastatin (ZOCOR) 10 MG tablet Take 1 tablet by mouth At Bedtime. 30 tablet 1     IBUPROFEN PO Take 400 mg by mouth       No Known Allergies    ROS:  Constitutional, HEENT, cardiovascular, pulmonary, gi and gu systems are negative, except as otherwise noted.    OBJECTIVE:     /80  Pulse 66  Temp 97.5  F (36.4  C) (Temporal)  Resp 12  Wt 225 lb (102.1 kg)  BMI 25.35 kg/m2  Body mass index is 25.35 kg/(m^2).   GENERAL: healthy and alert  NECK: no adenopathy, no asymmetry, masses, or scars and thyroid normal to palpation  RESP: lungs clear to auscultation - no rales, rhonchi or wheezes  CV: regular rate and rhythm, normal S1 S2, no S3 or S4, no murmur, click or rub, no peripheral edema and peripheral pulses strong  ABDOMEN: soft, nontender, no hepatosplenomegaly, no masses and bowel sounds normal  MSK: Spine: lumbar no obvious deformity. No skin changes noted. tender with palpation of spinous processes midline at T12, L1 and paraspinous muscles to the left. Tender into left gluteal area with pressure and along lateral thigh. Not TTP into calve muscle. ROM full: no limitations. Straight leg raise positive on left. Strength exam limited by pain No edema. Pulses normal. Patellar reflexes 2+ and symmetric. Achilles reflexes 1+ symmetric. Sensation intact to light touch of LE.  Is able to walk on heels and toes without difficulty.  Normal gait. Has limited forward bending, twisting, lateral bending of back due to pain.    Diagnostic Test Results:  none     ASSESSMENT/PLAN:     Problem List Items Addressed This Visit     None      Visit Diagnoses     Acute bilateral low back pain with left-sided sciatica    -  Primary    Relevant Medications    cyclobenzaprine (FLEXERIL)  10 MG tablet    methylPREDNISolone (MEDROL DOSEPAK) 4 MG tablet    HYDROcodone-acetaminophen (NORCO) 5-325 MG per tablet    cyclobenzaprine (FLEXERIL) 10 MG tablet           Patient seen one year ago for similar pain.  MRI at that time showed 1. Mild or early degenerative disc disease, greatest at L5-S1.  2. 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  Plain films negative.  Will treat with steroid burst, limited pain meds and mm relaxants.  Has appointment with his chiropractor next week.  Follow up in clinic if no improvement, worsening symptoms, or concerns.  Declined health maintenance today.      Bethanie Coleman, AYUSH  [facility]

## 2018-09-27 NOTE — PATIENT INSTRUCTIONS
Take the steroid as directed  Can take the flexeril every eight hours as needed for muscle spasm  Can take the hydrocodone every six hours as needed for pain not controlled by ibuprofen  Ice or heat whichever is comfortable.  Gentle stretching  No heavy lifting  Follow up if no improvement

## 2025-03-03 ENCOUNTER — LAB REQUISITION (OUTPATIENT)
Dept: LAB | Facility: CLINIC | Age: 55
End: 2025-03-03

## 2025-03-03 LAB
HOLD SPECIMEN: NORMAL
INR PPP: 1 (ref 0.85–1.15)

## 2025-03-03 PROCEDURE — 85610 PROTHROMBIN TIME: CPT | Performed by: NURSE PRACTITIONER
